# Patient Record
Sex: MALE | Race: BLACK OR AFRICAN AMERICAN | NOT HISPANIC OR LATINO | URBAN - METROPOLITAN AREA
[De-identification: names, ages, dates, MRNs, and addresses within clinical notes are randomized per-mention and may not be internally consistent; named-entity substitution may affect disease eponyms.]

---

## 2017-05-27 ENCOUNTER — INPATIENT (INPATIENT)
Facility: HOSPITAL | Age: 77
LOS: 4 days | Discharge: OTHER INSTITUTION | End: 2017-06-01
Attending: INTERNAL MEDICINE | Admitting: INTERNAL MEDICINE
Payer: MEDICAID

## 2017-05-27 VITALS
OXYGEN SATURATION: 98 % | HEART RATE: 65 BPM | TEMPERATURE: 99 F | RESPIRATION RATE: 20 BRPM | SYSTOLIC BLOOD PRESSURE: 89 MMHG | DIASTOLIC BLOOD PRESSURE: 60 MMHG

## 2017-05-27 DIAGNOSIS — R62.7 ADULT FAILURE TO THRIVE: ICD-10-CM

## 2017-05-27 LAB
ALBUMIN SERPL ELPH-MCNC: 2.6 G/DL — LOW (ref 3.3–5)
ALP SERPL-CCNC: 160 U/L — HIGH (ref 40–120)
ALT FLD-CCNC: 39 U/L — SIGNIFICANT CHANGE UP (ref 4–41)
APTT BLD: 29.6 SEC — SIGNIFICANT CHANGE UP (ref 27.5–37.4)
AST SERPL-CCNC: 47 U/L — HIGH (ref 4–40)
BASE EXCESS BLDV CALC-SCNC: -2.5 MMOL/L — SIGNIFICANT CHANGE UP
BASE EXCESS BLDV CALC-SCNC: 3.7 MMOL/L — SIGNIFICANT CHANGE UP
BASOPHILS # BLD AUTO: 0.01 K/UL — SIGNIFICANT CHANGE UP (ref 0–0.2)
BASOPHILS NFR BLD AUTO: 0.2 % — SIGNIFICANT CHANGE UP (ref 0–2)
BILIRUB SERPL-MCNC: 1 MG/DL — SIGNIFICANT CHANGE UP (ref 0.2–1.2)
BLOOD GAS VENOUS - CREATININE: 0.94 MG/DL — SIGNIFICANT CHANGE UP (ref 0.5–1.3)
BLOOD GAS VENOUS - CREATININE: 1.45 MG/DL — HIGH (ref 0.5–1.3)
BUN SERPL-MCNC: 36 MG/DL — HIGH (ref 7–23)
CALCIUM SERPL-MCNC: 8.4 MG/DL — SIGNIFICANT CHANGE UP (ref 8.4–10.5)
CHLORIDE BLDV-SCNC: 105 MMOL/L — SIGNIFICANT CHANGE UP (ref 96–108)
CHLORIDE BLDV-SCNC: 117 MMOL/L — HIGH (ref 96–108)
CHLORIDE SERPL-SCNC: 100 MMOL/L — SIGNIFICANT CHANGE UP (ref 98–107)
CO2 SERPL-SCNC: 24 MMOL/L — SIGNIFICANT CHANGE UP (ref 22–31)
CREAT SERPL-MCNC: 1.39 MG/DL — HIGH (ref 0.5–1.3)
EOSINOPHIL # BLD AUTO: 0.05 K/UL — SIGNIFICANT CHANGE UP (ref 0–0.5)
EOSINOPHIL NFR BLD AUTO: 0.9 % — SIGNIFICANT CHANGE UP (ref 0–6)
GAS PNL BLDV: 137 MMOL/L — SIGNIFICANT CHANGE UP (ref 136–146)
GAS PNL BLDV: 138 MMOL/L — SIGNIFICANT CHANGE UP (ref 136–146)
GLUCOSE BLDV-MCNC: 111 — HIGH (ref 70–99)
GLUCOSE BLDV-MCNC: 85 — SIGNIFICANT CHANGE UP (ref 70–99)
GLUCOSE SERPL-MCNC: 116 MG/DL — HIGH (ref 70–99)
HCO3 BLDV-SCNC: 21 MMOL/L — SIGNIFICANT CHANGE UP (ref 20–27)
HCO3 BLDV-SCNC: 26 MMOL/L — SIGNIFICANT CHANGE UP (ref 20–27)
HCT VFR BLD CALC: 39.6 % — SIGNIFICANT CHANGE UP (ref 39–50)
HCT VFR BLDV CALC: 29.3 % — LOW (ref 39–51)
HCT VFR BLDV CALC: 41.6 % — SIGNIFICANT CHANGE UP (ref 39–51)
HGB BLD-MCNC: 12.8 G/DL — LOW (ref 13–17)
HGB BLDV-MCNC: 13.6 G/DL — SIGNIFICANT CHANGE UP (ref 13–17)
HGB BLDV-MCNC: 9.4 G/DL — LOW (ref 13–17)
IMM GRANULOCYTES NFR BLD AUTO: 1.2 % — SIGNIFICANT CHANGE UP (ref 0–1.5)
INR BLD: 1.21 — HIGH (ref 0.88–1.17)
LACTATE BLDV-MCNC: 1.2 MMOL/L — SIGNIFICANT CHANGE UP (ref 0.5–2)
LACTATE BLDV-MCNC: 2.7 MMOL/L — HIGH (ref 0.5–2)
LYMPHOCYTES # BLD AUTO: 0.68 K/UL — LOW (ref 1–3.3)
LYMPHOCYTES # BLD AUTO: 11.7 % — LOW (ref 13–44)
MCHC RBC-ENTMCNC: 28.3 PG — SIGNIFICANT CHANGE UP (ref 27–34)
MCHC RBC-ENTMCNC: 32.3 % — SIGNIFICANT CHANGE UP (ref 32–36)
MCV RBC AUTO: 87.6 FL — SIGNIFICANT CHANGE UP (ref 80–100)
MONOCYTES # BLD AUTO: 0.28 K/UL — SIGNIFICANT CHANGE UP (ref 0–0.9)
MONOCYTES NFR BLD AUTO: 4.8 % — SIGNIFICANT CHANGE UP (ref 2–14)
NEUTROPHILS # BLD AUTO: 4.7 K/UL — SIGNIFICANT CHANGE UP (ref 1.8–7.4)
NEUTROPHILS NFR BLD AUTO: 81.2 % — HIGH (ref 43–77)
PCO2 BLDV: 38 MMHG — LOW (ref 41–51)
PCO2 BLDV: 43 MMHG — SIGNIFICANT CHANGE UP (ref 41–51)
PH BLDV: 7.38 PH — SIGNIFICANT CHANGE UP (ref 7.32–7.43)
PH BLDV: 7.43 PH — SIGNIFICANT CHANGE UP (ref 7.32–7.43)
PLATELET # BLD AUTO: 122 K/UL — LOW (ref 150–400)
PMV BLD: 10 FL — SIGNIFICANT CHANGE UP (ref 7–13)
PO2 BLDV: < 24 MMHG — LOW (ref 35–40)
PO2 BLDV: < 24 MMHG — LOW (ref 35–40)
POTASSIUM BLDV-SCNC: 3.3 MMOL/L — LOW (ref 3.4–4.5)
POTASSIUM BLDV-SCNC: 4.8 MMOL/L — HIGH (ref 3.4–4.5)
POTASSIUM SERPL-MCNC: 4.7 MMOL/L — SIGNIFICANT CHANGE UP (ref 3.5–5.3)
POTASSIUM SERPL-SCNC: 4.7 MMOL/L — SIGNIFICANT CHANGE UP (ref 3.5–5.3)
PROT SERPL-MCNC: 6.1 G/DL — SIGNIFICANT CHANGE UP (ref 6–8.3)
PROTHROM AB SERPL-ACNC: 13.6 SEC — HIGH (ref 9.8–13.1)
RBC # BLD: 4.52 M/UL — SIGNIFICANT CHANGE UP (ref 4.2–5.8)
RBC # FLD: 17.1 % — HIGH (ref 10.3–14.5)
SAO2 % BLDV: 18.9 % — LOW (ref 60–85)
SAO2 % BLDV: 21.4 % — LOW (ref 60–85)
SODIUM SERPL-SCNC: 140 MMOL/L — SIGNIFICANT CHANGE UP (ref 135–145)
WBC # BLD: 5.79 K/UL — SIGNIFICANT CHANGE UP (ref 3.8–10.5)
WBC # FLD AUTO: 5.79 K/UL — SIGNIFICANT CHANGE UP (ref 3.8–10.5)

## 2017-05-27 PROCEDURE — 70450 CT HEAD/BRAIN W/O DYE: CPT | Mod: 26

## 2017-05-27 PROCEDURE — 71020: CPT | Mod: 26

## 2017-05-27 PROCEDURE — 99223 1ST HOSP IP/OBS HIGH 75: CPT

## 2017-05-27 RX ORDER — SODIUM CHLORIDE 9 MG/ML
1000 INJECTION INTRAMUSCULAR; INTRAVENOUS; SUBCUTANEOUS ONCE
Qty: 0 | Refills: 0 | Status: COMPLETED | OUTPATIENT
Start: 2017-05-27 | End: 2017-05-27

## 2017-05-27 RX ORDER — ONDANSETRON 8 MG/1
4 TABLET, FILM COATED ORAL ONCE
Qty: 0 | Refills: 0 | Status: COMPLETED | OUTPATIENT
Start: 2017-05-27 | End: 2017-05-27

## 2017-05-27 RX ADMIN — ONDANSETRON 4 MILLIGRAM(S): 8 TABLET, FILM COATED ORAL at 20:26

## 2017-05-27 RX ADMIN — SODIUM CHLORIDE 1000 MILLILITER(S): 9 INJECTION INTRAMUSCULAR; INTRAVENOUS; SUBCUTANEOUS at 20:24

## 2017-05-27 RX ADMIN — SODIUM CHLORIDE 1000 MILLILITER(S): 9 INJECTION INTRAMUSCULAR; INTRAVENOUS; SUBCUTANEOUS at 21:13

## 2017-05-27 NOTE — ED PROVIDER NOTE - CARE PLAN
Principal Discharge DX:	Failure to thrive in adult  Secondary Diagnosis:	Fall  Secondary Diagnosis:	Abrasion

## 2017-05-27 NOTE — ED PROVIDER NOTE - OBJECTIVE STATEMENT
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 Pt with decreased PO intake, vomiting, weakness, fatigue. Was just d/c'd from [facility] after 1.5month stay in which he was dx and radiation treatment started at that time. Pt now lethargic, had fall 2 days ago 2/2 weakness, hit L side of head. Denies LOC, vomiting, CP, vision change, abd pain.

## 2017-05-27 NOTE — ED ADULT TRIAGE NOTE - CHIEF COMPLAINT QUOTE
c/o vomiting today pt history of lung cancer with mets currently receiving radiation treatments family states "he is not eating he fell two days ago has abrasion swelling over left eyebrow

## 2017-05-27 NOTE — ED PROVIDER NOTE - ATTENDING CONTRIBUTION TO CARE
I performed a face-to-face evaluation of the patient and performed a history and physical examination. I agree with the history and physical examination.    Jenn: 76 M, lung Ca mets to hip, spine, brain, vomiting and dehydration. Fell 2 days ago, hit L forehead just above eye. No HA now. Appears chronically ill, cachectic. Small abrasion above L eye. Normal mentation. Strong pulse. SBP 83, . Respirations unlabored. No LE edema. Will admit for failure-to-thrive. Give IVF. Check head CT.

## 2017-05-27 NOTE — ED PROVIDER NOTE - MEDICAL DECISION MAKING DETAILS
Jenn: 76 M, lung Ca mets to hip, spine, brain, vomiting and dehydration. Fell 2 days ago, hit L forehead just above eye. No HA now. Appears chronically ill, cachectic. Small abrasion above L eye. Normal mentation. Strong pulse. SBP 83, . Respirations unlabored. No LE edema. Will admit for failure-to-thrive. Give IVF. Check head CT.

## 2017-05-28 DIAGNOSIS — C34.90 MALIGNANT NEOPLASM OF UNSPECIFIED PART OF UNSPECIFIED BRONCHUS OR LUNG: ICD-10-CM

## 2017-05-28 DIAGNOSIS — Z29.9 ENCOUNTER FOR PROPHYLACTIC MEASURES, UNSPECIFIED: ICD-10-CM

## 2017-05-28 DIAGNOSIS — L89.310 PRESSURE ULCER OF RIGHT BUTTOCK, UNSTAGEABLE: ICD-10-CM

## 2017-05-28 DIAGNOSIS — I63.9 CEREBRAL INFARCTION, UNSPECIFIED: ICD-10-CM
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
## 2017-05-28 DIAGNOSIS — N17.9 ACUTE KIDNEY FAILURE, UNSPECIFIED: ICD-10-CM

## 2017-05-28 DIAGNOSIS — R62.7 ADULT FAILURE TO THRIVE: ICD-10-CM

## 2017-05-28 DIAGNOSIS — S02.69XA FRACTURE OF MANDIBLE OF OTHER SPECIFIED SITE, INITIAL ENCOUNTER FOR CLOSED FRACTURE: Chronic | ICD-10-CM

## 2017-05-28 DIAGNOSIS — D64.9 ANEMIA, UNSPECIFIED: ICD-10-CM

## 2017-05-28 LAB
ALBUMIN SERPL ELPH-MCNC: 2.2 G/DL — LOW (ref 3.3–5)
BASOPHILS NFR SPEC: 0 % — SIGNIFICANT CHANGE UP (ref 0–2)
BUN SERPL-MCNC: 29 MG/DL — HIGH (ref 7–23)
CALCIUM SERPL-MCNC: 7.8 MG/DL — LOW (ref 8.4–10.5)
CHLORIDE SERPL-SCNC: 108 MMOL/L — HIGH (ref 98–107)
CO2 SERPL-SCNC: 20 MMOL/L — LOW (ref 22–31)
CREAT SERPL-MCNC: 0.8 MG/DL — SIGNIFICANT CHANGE UP (ref 0.5–1.3)
EOSINOPHIL NFR FLD: 2.6 % — SIGNIFICANT CHANGE UP (ref 0–6)
FERRITIN SERPL-MCNC: 3552 NG/ML — HIGH (ref 30–400)
FOLATE SERPL-MCNC: 13.3 NG/ML — SIGNIFICANT CHANGE UP (ref 4.7–20)
GIANT PLATELETS BLD QL SMEAR: PRESENT — SIGNIFICANT CHANGE UP
GLUCOSE SERPL-MCNC: 74 MG/DL — SIGNIFICANT CHANGE UP (ref 70–99)
HCT VFR BLD CALC: 34.8 % — LOW (ref 39–50)
HGB BLD-MCNC: 11.1 G/DL — LOW (ref 13–17)
IRON SATN MFR SERPL: 129 UG/DL — LOW (ref 155–535)
IRON SATN MFR SERPL: 30 UG/DL — LOW (ref 45–165)
LYMPHOCYTES NFR SPEC AUTO: 2.6 % — LOW (ref 13–44)
MAGNESIUM SERPL-MCNC: 2.1 MG/DL — SIGNIFICANT CHANGE UP (ref 1.6–2.6)
MCHC RBC-ENTMCNC: 28.2 PG — SIGNIFICANT CHANGE UP (ref 27–34)
MCHC RBC-ENTMCNC: 31.9 % — LOW (ref 32–36)
MCV RBC AUTO: 88.3 FL — SIGNIFICANT CHANGE UP (ref 80–100)
MONOCYTES NFR BLD: 4.3 % — SIGNIFICANT CHANGE UP (ref 2–9)
NEUTROPHIL AB SER-ACNC: 80.1 % — HIGH (ref 43–77)
NEUTS BAND # BLD: 10.4 % — HIGH (ref 0–6)
PHOSPHATE SERPL-MCNC: 3.7 MG/DL — SIGNIFICANT CHANGE UP (ref 2.5–4.5)
PLATELET # BLD AUTO: 129 K/UL — LOW (ref 150–400)
PLATELET COUNT - ESTIMATE: SIGNIFICANT CHANGE UP
PMV BLD: 10.4 FL — SIGNIFICANT CHANGE UP (ref 7–13)
POIKILOCYTOSIS BLD QL AUTO: SIGNIFICANT CHANGE UP
POTASSIUM SERPL-MCNC: 4.3 MMOL/L — SIGNIFICANT CHANGE UP (ref 3.5–5.3)
POTASSIUM SERPL-SCNC: 4.3 MMOL/L — SIGNIFICANT CHANGE UP (ref 3.5–5.3)
RBC # BLD: 3.94 M/UL — LOW (ref 4.2–5.8)
RBC # FLD: 17.3 % — HIGH (ref 10.3–14.5)
SODIUM SERPL-SCNC: 144 MMOL/L — SIGNIFICANT CHANGE UP (ref 135–145)
SPECIMEN SOURCE: SIGNIFICANT CHANGE UP
UIBC SERPL-MCNC: 99 UG/DL — LOW (ref 110–370)
VIT B12 SERPL-MCNC: > 2000 PG/ML — HIGH (ref 200–900)
WBC # BLD: 3.84 K/UL — SIGNIFICANT CHANGE UP (ref 3.8–10.5)
WBC # FLD AUTO: 3.84 K/UL — SIGNIFICANT CHANGE UP (ref 3.8–10.5)

## 2017-05-28 PROCEDURE — 73630 X-RAY EXAM OF FOOT: CPT | Mod: 26,LT

## 2017-05-28 PROCEDURE — 72170 X-RAY EXAM OF PELVIS: CPT | Mod: 26

## 2017-05-28 PROCEDURE — 73552 X-RAY EXAM OF FEMUR 2/>: CPT | Mod: 26,RT

## 2017-05-28 PROCEDURE — 73610 X-RAY EXAM OF ANKLE: CPT | Mod: 26,LT

## 2017-05-28 RX ORDER — ONDANSETRON 8 MG/1
4 TABLET, FILM COATED ORAL EVERY 6 HOURS
Qty: 0 | Refills: 0 | Status: DISCONTINUED | OUTPATIENT
Start: 2017-05-28 | End: 2017-06-01

## 2017-05-28 RX ORDER — ATORVASTATIN CALCIUM 80 MG/1
40 TABLET, FILM COATED ORAL AT BEDTIME
Qty: 0 | Refills: 0 | Status: DISCONTINUED | OUTPATIENT
Start: 2017-05-28 | End: 2017-05-30

## 2017-05-28 RX ORDER — SODIUM CHLORIDE 9 MG/ML
1000 INJECTION INTRAMUSCULAR; INTRAVENOUS; SUBCUTANEOUS
Qty: 0 | Refills: 0 | Status: DISCONTINUED | OUTPATIENT
Start: 2017-05-28 | End: 2017-06-01

## 2017-05-28 RX ORDER — ASPIRIN/CALCIUM CARB/MAGNESIUM 324 MG
81 TABLET ORAL DAILY
Qty: 0 | Refills: 0 | Status: DISCONTINUED | OUTPATIENT
Start: 2017-05-28 | End: 2017-06-01

## 2017-05-28 RX ORDER — ASPIRIN/CALCIUM CARB/MAGNESIUM 324 MG
1 TABLET ORAL
Qty: 0 | Refills: 0 | COMMUNITY

## 2017-05-28 RX ADMIN — SODIUM CHLORIDE 75 MILLILITER(S): 9 INJECTION INTRAMUSCULAR; INTRAVENOUS; SUBCUTANEOUS at 01:07

## 2017-05-28 RX ADMIN — SODIUM CHLORIDE 50 MILLILITER(S): 9 INJECTION INTRAMUSCULAR; INTRAVENOUS; SUBCUTANEOUS at 15:58

## 2017-05-28 NOTE — PROGRESS NOTE ADULT - PROBLEM SELECTOR PLAN 5
- Will need to obtain records from [facility] to properly evaluate extent and work up of patient's disease  - onc eval after discussing with pts family  - Will check xrays of pelvis/R femur, L ankle/foot given pain when moving those joints to exclude bone pathology given his history of bone mets

## 2017-05-28 NOTE — DIETITIAN INITIAL EVALUATION ADULT. - OTHER INFO
Nutrition consult received for cachexia, FTT; admitted from home for weakness, frequent falls and progressive decline in appetite/PO. Medical diagnosis of stage 4 lung cancer with mets to the brain, bone, spine, s/p RT. Met with patient, speaks very softly and slow. RN confirms PO intake is minimal, only with a few bites of oatmeal and yogurt at breakfast. No GI distress (nausea/vomiting/diarrhea/constipation) reported. Patient appears with poor dentition however denies any issues chewing and declines need for softer diet consistency to aid in PO intake. Patient did not provide much diet history and although confirms h/o weight loss, cannot quantify or recall UBW. Ensure supplements suggested which patient was amenable to.

## 2017-05-28 NOTE — DIETITIAN INITIAL EVALUATION ADULT. - SIGNS/SYMPTOMS
as evidenced by <75% nutrition needs >1 month, significant weight loss amount not specified) as evidenced by stage 2 pressure ulcer, poor PO intake, stage 4 lung cancer

## 2017-05-28 NOTE — H&P ADULT - PROBLEM SELECTOR PLAN 2
- Fall likely in setting of his R hip and L foot weakness, unclear cause for the weakness though given his history of brain mets/spinal mets and CT finding of prior infarcts they are the likely causes; states the weakness have been stable for the past 2 months, denies any numbness with the weakness  - Will obtain PT eval, fall precautions

## 2017-05-28 NOTE — H&P ADULT - PROBLEM SELECTOR PLAN 6
- No pharmacological DVT ppx for now given his recent history of brain mets, will place on SCDs for DVT ppx  - Fall precautions - Was discharged on aspirin 81 from [facility] though he does not know why he was started on it  - Denies being told he has had CVAs in past  - Will c/w ASA 81 for now, will add on lipitor 40mg qhs  - Consider neuro eval in AM - Wound care eval in AM

## 2017-05-28 NOTE — PATIENT PROFILE ADULT. - NS TRANSFER PATIENT BELONGINGS
Cell Phone/PDA (specify)/Clothing/shirt and pants, underwear, undershirt, $13 in wallet/Jewelry/Money (specify)

## 2017-05-28 NOTE — H&P ADULT - PROBLEM SELECTOR PLAN 5
- Was discharged on aspirin 81 from [facility] though he does not know why he was started on it  - Denies being told he has had CVAs in past  - Will c/w ASA 81 for now, will add on lipitor 40mg qhs  - Consider neuro eval in AM - Wound care eval in AM - Will need to obtain records from [facility] to properly evaluate extent and work up of patient's disease  - Consider onc eval in AM  - Will check xrays of pelvis/R femur, L ankle/foot given pain when moving those joints to exclude bone pathology given his history of bone mets

## 2017-05-28 NOTE — H&P ADULT - PROBLEM SELECTOR PROBLEM 5
Cerebrovascular accident (CVA), unspecified mechanism Decubitus ulcer of right buttock, unstageable Lung cancer metastatic to bone

## 2017-05-28 NOTE — H&P ADULT - HISTORY OF PRESENT ILLNESS
This is a 76M with history of Stage 4 lung cancer with mets to brain, spine and bone s/p RT (recently hospitalized at [facility] from 4/13 - 5/25) who is brought to the hospital by his daughter due to weakness and inability to care for self at home. As per the patient and his daughter, he had initially gone to [facility] for a check up and lab work there showed gross abnormalities (both dont remember what the abnormalities were) which caused him to be hospitalized at [facility] where work up showed him to have stage 4 lung cancer with mets to brain, spine and bones. They said that he had RT during his hospitalization with last session being on 5/24 and was discharged home on 5/25. Daughter said that after the patient was discharged home, he was unable to care for himself properly, said that the patient is now completely dependant on others for his care when previously he was independent. Said that the patient's appetite is also severely decreased and that he experiences nausea/vomiting frequently after eating. Said that the patient also has had falls while trying to ambulate and recently suffered a small cut to his L eyebrow. Due to the patient's deconditioning, the daughter decided to bring him into the hospital for evaluation. Patient currently states that he is having some pain in his R thigh and L foot especially with movement, said that his L foot drags whenever he tries to walk and that causes him to fall, also said that he is having urinary incontinence 2/2 difficulty ambulating to the bathroom and therefore soils himself, denies any fecal incontinence. Said that these symptoms have been stable for the past 2 months, denies any worsening. Denies any HAs, numbness, back pain, neck pain, SOB, or other symptoms currently.     In the ED, his vitals were T 98.7, P 85, BP 89/60-> 108/57, R 20, O2 sat 98% RA. His lab work was significant for elevated BUN/Cr and elevated lactate. He was given 2L NS and zofran 4mg IVP x1. He was admitted to medicine.

## 2017-05-28 NOTE — H&P ADULT - NSHPPHYSICALEXAM_GEN_ALL_CORE
Vital Signs Last 24 Hrs  T(C): 36.7, Max: 37.1 (05-27 @ 18:41)  T(F): 98, Max: 98.7 (05-27 @ 18:41)  HR: 88 (65 - 112)  BP: 98/55 (83/58 - 108/57)  BP(mean): --  RR: 18 (15 - 20)  SpO2: 100% (98% - 100%)    GENERAL: Cachectic   HEAD:  Small cut on R eyebrow, no discharge, no erythema, no pain  ENT: EOMI, PERRLA, conjunctiva and sclera clear, Neck supple, No JVD, moist mucosa, no pharynageal erythema, no tonsillar enlargement or exudate  CHEST/LUNG: +expiratory crackles throughout  BACK: No spinal tenderness  HEART: Regular rate and rhythm; No murmurs, rubs, or gallops  ABDOMEN: Soft, Nontender, Nondistended; Bowel sounds present, no organomegaly  EXTREMITIES:  No clubbing, cyanosis, or edema  PSYCH: Nl behavior, nl affect  NEUROLOGY: AAOx3, CN II-XII intact; 5/5 strength on b/l upper extremities; 4/5 strength of R hip, 5/5 strength of R knee and R ankle/foot; 5/5 strength of L hip and L knee, 4/5 strength of L ankle/foot  SKIN: unstable ulcer of R buttock, no discharge or drainage

## 2017-05-28 NOTE — H&P ADULT - PROBLEM SELECTOR PROBLEM 6
Need for prophylactic measure Cerebrovascular accident (CVA), unspecified mechanism Decubitus ulcer of right buttock, unstageable

## 2017-05-28 NOTE — DIETITIAN INITIAL EVALUATION ADULT. - ETIOLOGY
related to patient meets criteria for severe malnutrition in context of chronic illness related to wound healing, metastatic disease

## 2017-05-28 NOTE — H&P ADULT - ASSESSMENT
This is a 76M with history of stage 4 cancer s/p RT who presents to the hospital with failure to thrive.

## 2017-05-28 NOTE — H&P ADULT - PROBLEM SELECTOR PROBLEM 4
Decubitus ulcer of right buttock, unstageable Lung cancer metastatic to bone Anemia, unspecified type

## 2017-05-28 NOTE — H&P ADULT - PROBLEM SELECTOR PLAN 4
- Wound care eval in AM - Will need to obtain records from [facility] to properly evaluate extent and work up of patient's disease  - Consider onc eval in AM  - Will check xrays of pelvis/R femur, L ankle/foot given pain when moving those joints to exclude bone pathology given his history of bone mets - patient with mild anemia initially that worsened with hydration suggesting that initial levels were likely hemoconcentrated  - Would trend level in AM, would check iron studies, B12, folate

## 2017-05-28 NOTE — H&P ADULT - PROBLEM SELECTOR PLAN 3
- Will need to obtain records from [facility] to properly evaluate extent and work up of patient's disease  - Consider onc eval in AM  - Will check xrays of pelvis/R femur, L ankle/foot given pain when moving those joints to exclude bone pathology given his history of bone mets - patient with elevated BUN/Cr that improved on repeat VBG s/p IVF, likely has EMMETT due to decreased PO intake  - Will continue with IVF x12 hrs and check BUN/Cr in AM

## 2017-05-28 NOTE — H&P ADULT - PROBLEM SELECTOR PLAN 7
- No pharmacological DVT ppx for now given his recent history of brain mets, will place on SCDs for DVT ppx  - Fall precautions - Was discharged on aspirin 81 from [facility] though he does not know why he was started on it  - Denies being told he has had CVAs in past  - Will c/w ASA 81 for now, will add on lipitor 40mg qhs  - Consider neuro eval in AM

## 2017-05-28 NOTE — PROGRESS NOTE ADULT - PROBLEM SELECTOR PLAN 7
-cont aspirin which   - Denies being told he has had CVAs in past  - Will c/w ASA 81 for now, will add on lipitor 40mg qhs

## 2017-05-28 NOTE — DIETITIAN INITIAL EVALUATION ADULT. - NS AS NUTRI INTERV MEALS SNACK3
1) Continue Regular diet which remains appropriate and adequate      2) Recommend Ensure Enlive 240mls 2x daily (700kcal, 40g protein).    3) If medical condition and goals of care warrants, recommend discussion for alternate means of nutrition support/General/healthful diet General/healthful diet/1) Continue Regular diet which remains appropriate and adequate. Will also provide Mighty Shake 1x/day at breakfast to further optimize PO intake      2) Recommend Ensure Enlive 240mls 2x daily (700kcal, 40g protein).    3) If medical condition and goals of care warrants, recommend discussion for alternate means of nutrition support

## 2017-05-28 NOTE — PROGRESS NOTE ADULT - SUBJECTIVE AND OBJECTIVE BOX
SUBJECTIVE / OVERNIGHT EVENTS: denies chest pain, shortness of breath      MEDICATIONS  (STANDING):  sodium chloride 0.9%. 1000milliLiter(s) IV Continuous <Continuous>  aspirin enteric coated 81milliGRAM(s) Oral daily  atorvastatin 40milliGRAM(s) Oral at bedtime    MEDICATIONS  (PRN):  ondansetron Injectable 4milliGRAM(s) IV Push every 6 hours PRN Nausea and/or Vomiting      Vital Signs Last 24 Hrs  T(C): 36.9, Max: 37.1 (05-27 @ 18:41)  HR: 98 (65 - 112)  BP: 103/56 (83/58 - 108/57)  RR: 18 (15 - 20)  SpO2: 100% (98% - 100%)  Wt(kg): --  CAPILLARY BLOOD GLUCOSE    I&O's Summary      Constitutional: No fever, fatigue or weight loss.  Skin: No rash.  Eyes: No recent vision problems or eye pain.  ENT: No congestion, ear pain, or sore throat.  Endocrine: No thyroid problems.  Cardiovascular: No chest pain or palpation.  Respiratory: No cough, shortness of breath, congestion, or wheezing.  Gastrointestinal: No abdominal pain, nausea, vomiting, or diarrhea.  Genitourinary: No dysuria.  Musculoskeletal: No joint swelling.  Neurologic: No headache.    PHYSICAL EXAM:  GENERAL: NAD, well-developed  HEAD:  Atraumatic, Normocephalic  EYES: EOMI, PERRLA, conjunctiva and sclera clear  NECK: Supple, No JVD  CHEST/LUNG: dec breath sounds at bases  HEART: Regular rate and rhythm; No murmurs, rubs, or gallops  ABDOMEN: Soft, Nontender, Nondistended; Bowel sounds present  EXTREMITIES:  2+ Peripheral Pulses, No clubbing, cyanosis, or edema  PSYCH: Alert awake calm cooperative  SKIN: No rashes or lesions  LABS:                        11.1   3.84  )-----------( 129      ( 28 May 2017 06:45 )             34.8     05-28    144  |  108<H>  |  29<H>  ----------------------------<  74  4.3   |  20<L>  |  0.80    Ca    7.8<L>      28 May 2017 06:45  Phos  3.7     05-28  Mg     2.1     05-28    TPro  x   /  Alb  2.2<L>  /  TBili  x   /  DBili  x   /  AST  x   /  ALT  x   /  AlkPhos  x   05-28    PT/INR - ( 27 May 2017 19:51 )   PT: 13.6 SEC;   INR: 1.21          PTT - ( 27 May 2017 19:51 )  PTT:29.6 SEC          RADIOLOGY & ADDITIONAL TESTS:    Imaging Personally Reviewed:    Consultant(s) Notes Reviewed:      Care Discussed with Consultants/Other Providers:

## 2017-05-28 NOTE — H&P ADULT - NSHPLABSRESULTS_GEN_ALL_CORE
Labs (27 May 2017 19:51):               12.8   5.79  )-----------( 122                   39.6     140  |  100  |  36<H>  ----------------------------<  116<H>  4.7   |  24  |  1.39<H>    Ca    8.4      27 May 2017 19:51  TPro  6.1  /  Alb  2.6<L>  /  TBili  1.0  /  DBili  x   /  AST  47<H>  /  ALT  39  /  AlkPhos  160<H>  05-27    LIVER FUNCTIONS - ( 27 May 2017 19:51 )  Alb: 2.6 g/dL / Pro: 6.1 g/dL / ALK PHOS: 160 u/L / ALT: 39 u/L / AST: 47 u/L / GGT: x           PT/INR - ( 27 May 2017 19:51 )   PT: 13.6 SEC;   INR: 1.21     PTT - ( 27 May 2017 19:51 )  PTT:29.6 SEC    Lactate 2.7 -> 1.2    CXR - prelim - 1.6 cm left upper lobe mass which may be compatible with patient's history of lung cancer. Increased bilateral reticular opacities    CT Brain - There is no acute intra-axial or extra-axial hemorrhage. There is no mass effect or shift of the midline. Cerebral volume loss with prominence of the ventricles and sulci. Encephalomalacia and gliosis is noted in the right frontal lobe, likely from prior infarct. Mild ex vacuo dilatation on the frontal horn of the right lateral ventricle. Moderate chronic ischemic changes are seen in the frontoparietal white matter. No acute intracranial hemorrhage, mass effect, or CT evidence of an acute territorial infarct.

## 2017-05-28 NOTE — H&P ADULT - PROBLEM SELECTOR PLAN 8
- No pharmacological DVT ppx for now given his recent history of brain mets, will place on SCDs for DVT ppx  - Fall precautions

## 2017-05-28 NOTE — H&P ADULT - NSHPREVIEWOFSYSTEMS_GEN_ALL_CORE
REVIEW OF SYSTEMS:    CONSTITUTIONAL: +Weakness/malaise, +anorexia, +weight loss ~20 lbs over 2 months; No fevers/chills  EYES/ENT: No visual changes;  No dysphagia  NECK: No pain or stiffness  RESPIRATORY: No cough, wheezing, hemoptysis; No shortness of breath  CARDIOVASCULAR: No chest pain or palpitations; No lower extremity edema  GASTROINTESTINAL: +Nausea/Vomiting; No abdominal or epigastric pain. No diarrhea or constipation. No melena or hematochezia.  GENITOURINARY: No dysuria, frequency or hematuria  MSK: R thigh pain and L ankle pain  NEUROLOGICAL: +weakness of R thigh and L foot; No numbness, no HA, no change in mental status  SKIN: unstageable ulcer on R buttock  All other review of systems is negative unless indicated above.

## 2017-05-28 NOTE — CHART NOTE - NSCHARTNOTEFT_GEN_A_CORE
NUTRITION SERVICES     Upon Nutritional Assessment by the Registered Dietitian your patient was determined to meet criteria/ has evidence of the following diagnosis/diagnoses:  [ ] Mild Protein Calorie Malnutrition   [ ] Moderate Protein Calorie Malnutrition   [ x ] Severe Protein Calorie Malnutrition   [ ] Unspecified Protein Calorie Malnutrition   [ ] Underweight / BMI <19  [ ] Morbid Obesity / BMI >40    Findings as based on:  •  Comprehensive nutrition assessment and consultation   •  Calorie Counts (nutrient intake analysis)   •  Food acceptance and intake status from observations by staff  •  Follow up  •  Patient education  •  Intervention secondary to interdisciplinary rounds  •   concerns     Treatment:  The following diet has been recommended:    1) Continue Regular diet which remains appropriate and adequate        2) Recommend Ensure Enlive 240mls 2x daily (700kcal, 40g protein).      3) If medical condition and goals of care warrants, recommend discussion for alternate means of nutrition support

## 2017-05-28 NOTE — H&P ADULT - NSHPSOCIALHISTORY_GEN_ALL_CORE
Social History:    Marital Status:  (   )    ( X ) Single    (   )    (  )   Occupation: Retired  Lives with: (  ) alone  (  ) children   (  ) spouse   (  ) parents  ( X ) other - Friends    Substance Use (street drugs): ( X ) never used  (  ) other:  Tobacco Usage:  Former Smoker, smoked 1/2 PPD x 60 yrs for 30 pack year history, Quit in Jan/Feb 2017  Alcohol Usage: Social drinker, last drink on New Years 2017

## 2017-05-29 LAB
APPEARANCE UR: CLEAR — SIGNIFICANT CHANGE UP
B PERT DNA SPEC QL NAA+PROBE: SIGNIFICANT CHANGE UP
BACTERIA # UR AUTO: SIGNIFICANT CHANGE UP
BILIRUB UR-MCNC: SIGNIFICANT CHANGE UP
BLOOD UR QL VISUAL: HIGH
C PNEUM DNA SPEC QL NAA+PROBE: NOT DETECTED — SIGNIFICANT CHANGE UP
COLOR SPEC: YELLOW — SIGNIFICANT CHANGE UP
FLUAV H1 2009 PAND RNA SPEC QL NAA+PROBE: NOT DETECTED — SIGNIFICANT CHANGE UP
FLUAV H1 RNA SPEC QL NAA+PROBE: NOT DETECTED — SIGNIFICANT CHANGE UP
FLUAV H3 RNA SPEC QL NAA+PROBE: NOT DETECTED — SIGNIFICANT CHANGE UP
FLUAV SUBTYP SPEC NAA+PROBE: SIGNIFICANT CHANGE UP
FLUBV RNA SPEC QL NAA+PROBE: NOT DETECTED — SIGNIFICANT CHANGE UP
GLUCOSE UR-MCNC: NEGATIVE — SIGNIFICANT CHANGE UP
HADV DNA SPEC QL NAA+PROBE: NOT DETECTED — SIGNIFICANT CHANGE UP
HCOV 229E RNA SPEC QL NAA+PROBE: NOT DETECTED — SIGNIFICANT CHANGE UP
HCOV HKU1 RNA SPEC QL NAA+PROBE: NOT DETECTED — SIGNIFICANT CHANGE UP
HCOV NL63 RNA SPEC QL NAA+PROBE: NOT DETECTED — SIGNIFICANT CHANGE UP
HCOV OC43 RNA SPEC QL NAA+PROBE: NOT DETECTED — SIGNIFICANT CHANGE UP
HMPV RNA SPEC QL NAA+PROBE: NOT DETECTED — SIGNIFICANT CHANGE UP
HPIV1 RNA SPEC QL NAA+PROBE: NOT DETECTED — SIGNIFICANT CHANGE UP
HPIV2 RNA SPEC QL NAA+PROBE: NOT DETECTED — SIGNIFICANT CHANGE UP
HPIV3 RNA SPEC QL NAA+PROBE: NOT DETECTED — SIGNIFICANT CHANGE UP
HPIV4 RNA SPEC QL NAA+PROBE: NOT DETECTED — SIGNIFICANT CHANGE UP
HYALINE CASTS # UR AUTO: SIGNIFICANT CHANGE UP (ref 0–?)
KETONES UR-MCNC: SIGNIFICANT CHANGE UP
LEUKOCYTE ESTERASE UR-ACNC: NEGATIVE — SIGNIFICANT CHANGE UP
M PNEUMO DNA SPEC QL NAA+PROBE: NOT DETECTED — SIGNIFICANT CHANGE UP
MUCOUS THREADS # UR AUTO: SIGNIFICANT CHANGE UP
NITRITE UR-MCNC: NEGATIVE — SIGNIFICANT CHANGE UP
PH UR: 6 — SIGNIFICANT CHANGE UP (ref 4.6–8)
PROT UR-MCNC: 100 — SIGNIFICANT CHANGE UP
RBC CASTS # UR COMP ASSIST: SIGNIFICANT CHANGE UP (ref 0–?)
RSV RNA SPEC QL NAA+PROBE: NOT DETECTED — SIGNIFICANT CHANGE UP
RV+EV RNA SPEC QL NAA+PROBE: NOT DETECTED — SIGNIFICANT CHANGE UP
SP GR SPEC: 1.03 — SIGNIFICANT CHANGE UP (ref 1–1.03)
SQUAMOUS # UR AUTO: SIGNIFICANT CHANGE UP
UROBILINOGEN FLD QL: >8 E.U. — SIGNIFICANT CHANGE UP (ref 0.1–0.2)
WBC UR QL: HIGH (ref 0–?)
YEAST BUDDING # UR COMP ASSIST: SIGNIFICANT CHANGE UP

## 2017-05-29 PROCEDURE — 71010: CPT | Mod: 26

## 2017-05-29 RX ORDER — ACETAMINOPHEN 500 MG
650 TABLET ORAL ONCE
Qty: 0 | Refills: 0 | Status: COMPLETED | OUTPATIENT
Start: 2017-05-29 | End: 2017-05-29

## 2017-05-29 RX ORDER — SODIUM CHLORIDE 9 MG/ML
1000 INJECTION INTRAMUSCULAR; INTRAVENOUS; SUBCUTANEOUS
Qty: 0 | Refills: 0 | Status: DISCONTINUED | OUTPATIENT
Start: 2017-05-29 | End: 2017-06-01

## 2017-05-29 RX ADMIN — SODIUM CHLORIDE 50 MILLILITER(S): 9 INJECTION INTRAMUSCULAR; INTRAVENOUS; SUBCUTANEOUS at 17:32

## 2017-05-29 RX ADMIN — Medication 81 MILLIGRAM(S): at 12:16

## 2017-05-29 RX ADMIN — Medication 650 MILLIGRAM(S): at 16:29

## 2017-05-29 RX ADMIN — ATORVASTATIN CALCIUM 40 MILLIGRAM(S): 80 TABLET, FILM COATED ORAL at 21:40

## 2017-05-29 NOTE — PROGRESS NOTE ADULT - SUBJECTIVE AND OBJECTIVE BOX
SUBJECTIVE / OVERNIGHT EVENTS: no chest pain, sob      MEDICATIONS  (STANDING):  sodium chloride 0.9%. 1000milliLiter(s) IV Continuous <Continuous>  aspirin enteric coated 81milliGRAM(s) Oral daily  atorvastatin 40milliGRAM(s) Oral at bedtime  sodium chloride 0.9%. 1000milliLiter(s) IV Continuous <Continuous>  sodium chloride 0.9%. 1000milliLiter(s) IV Continuous <Continuous>    MEDICATIONS  (PRN):  ondansetron Injectable 4milliGRAM(s) IV Push every 6 hours PRN Nausea and/or Vomiting      Vital Signs Last 24 Hrs  T(C): 36.6, Max: 39.1 (05-29 @ 15:23)  HR: 118 (95 - 118)  BP: 104/62 (100/56 - 105/65)  RR: 18 (18 - 20)  SpO2: 100% (93% - 100%)  Wt(kg): --  CAPILLARY BLOOD GLUCOSE    I&O's Summary    I & Os for current day (as of 29 May 2017 23:29)  =============================================  IN: 0 ml / OUT: 200 ml / NET: -200 ml      Constitutional: No fever, fatigue or weight loss.  Skin: No rash.  Eyes: No recent vision problems or eye pain.  ENT: No congestion, ear pain, or sore throat.  Endocrine: No thyroid problems.  Cardiovascular: No chest pain or palpation.  Respiratory: No cough, shortness of breath, congestion, or wheezing.  Gastrointestinal: No abdominal pain, nausea, vomiting, or diarrhea.  Genitourinary: No dysuria.  Musculoskeletal: No joint swelling.  Neurologic: No headache.    PHYSICAL EXAM:  GENERAL: NAD,cachectic  HEAD:  Atraumatic, Normocephalic  EYES: EOMI, PERRLA, conjunctiva and sclera clear  NECK: Supple, No JVD  CHEST/LUNG: dec breath sounds at bases  HEART: Regular rate and rhythm; No murmurs, rubs, or gallops  ABDOMEN: Soft, Nontender, Nondistended; Bowel sounds present  EXTREMITIES:  2+ Peripheral Pulses, No clubbing, cyanosis, or edema  PSYCH: Alert awake calm   SKIN: No rashes or lesions  LABS:                        11.1   3.84  )-----------( 129      ( 28 May 2017 06:45 )             34.8         144  |  108<H>  |  29<H>  ----------------------------<  74  4.3   |  20<L>  |  0.80    Ca    7.8<L>      28 May 2017 06:45  Phos  3.7       Mg     2.1         TPro  x   /  Alb  2.2<L>  /  TBili  x   /  DBili  x   /  AST  x   /  ALT  x   /  AlkPhos  x             Urinalysis Basic - ( 29 May 2017 21:15 )    Color: YELLOW / Appearance: CLEAR / S.026 / pH: 6.0  Gluc: NEGATIVE / Ketone: MODERATE  / Bili: SMALL / Urobili: >8 E.U.   Blood: MODERATE / Protein: 100 / Nitrite: NEGATIVE   Leuk Esterase: NEGATIVE / RBC: 10-25 / WBC 5-10   Sq Epi: OCC / Non Sq Epi: x / Bacteria: FEW        RADIOLOGY & ADDITIONAL TESTS:    Imaging Personally Reviewed:    Consultant(s) Notes Reviewed:      Care Discussed with Consultants/Other Providers:

## 2017-05-30 LAB
BUN SERPL-MCNC: 21 MG/DL — SIGNIFICANT CHANGE UP (ref 7–23)
CALCIUM SERPL-MCNC: 8.3 MG/DL — LOW (ref 8.4–10.5)
CHLORIDE SERPL-SCNC: 110 MMOL/L — HIGH (ref 98–107)
CO2 SERPL-SCNC: 15 MMOL/L — LOW (ref 22–31)
CREAT SERPL-MCNC: 0.65 MG/DL — SIGNIFICANT CHANGE UP (ref 0.5–1.3)
GLUCOSE SERPL-MCNC: 55 MG/DL — LOW (ref 70–99)
HCT VFR BLD CALC: 43.8 % — SIGNIFICANT CHANGE UP (ref 39–50)
HGB BLD-MCNC: 13.9 G/DL — SIGNIFICANT CHANGE UP (ref 13–17)
MCHC RBC-ENTMCNC: 27.8 PG — SIGNIFICANT CHANGE UP (ref 27–34)
MCHC RBC-ENTMCNC: 31.7 % — LOW (ref 32–36)
MCV RBC AUTO: 87.6 FL — SIGNIFICANT CHANGE UP (ref 80–100)
PLATELET # BLD AUTO: 132 K/UL — LOW (ref 150–400)
PMV BLD: 10.8 FL — SIGNIFICANT CHANGE UP (ref 7–13)
POTASSIUM SERPL-MCNC: 5.4 MMOL/L — HIGH (ref 3.5–5.3)
POTASSIUM SERPL-SCNC: 5.4 MMOL/L — HIGH (ref 3.5–5.3)
RBC # BLD: 5 M/UL — SIGNIFICANT CHANGE UP (ref 4.2–5.8)
RBC # FLD: 18.5 % — HIGH (ref 10.3–14.5)
SODIUM SERPL-SCNC: 146 MMOL/L — HIGH (ref 135–145)
SPECIMEN SOURCE: SIGNIFICANT CHANGE UP
WBC # BLD: 4.17 K/UL — SIGNIFICANT CHANGE UP (ref 3.8–10.5)
WBC # FLD AUTO: 4.17 K/UL — SIGNIFICANT CHANGE UP (ref 3.8–10.5)

## 2017-05-30 PROCEDURE — 71250 CT THORAX DX C-: CPT | Mod: 26

## 2017-05-30 RX ORDER — SODIUM CHLORIDE 9 MG/ML
1000 INJECTION, SOLUTION INTRAVENOUS
Qty: 0 | Refills: 0 | Status: DISCONTINUED | OUTPATIENT
Start: 2017-05-30 | End: 2017-05-31

## 2017-05-30 RX ORDER — DEXTROSE 50 % IN WATER 50 %
25 SYRINGE (ML) INTRAVENOUS DAILY
Qty: 0 | Refills: 0 | Status: DISCONTINUED | OUTPATIENT
Start: 2017-05-30 | End: 2017-05-30

## 2017-05-30 RX ORDER — SODIUM CHLORIDE 9 MG/ML
1000 INJECTION, SOLUTION INTRAVENOUS
Qty: 0 | Refills: 0 | Status: DISCONTINUED | OUTPATIENT
Start: 2017-05-30 | End: 2017-05-30

## 2017-05-30 RX ORDER — SODIUM CHLORIDE 9 MG/ML
1000 INJECTION INTRAMUSCULAR; INTRAVENOUS; SUBCUTANEOUS ONCE
Qty: 0 | Refills: 0 | Status: DISCONTINUED | OUTPATIENT
Start: 2017-05-30 | End: 2017-05-30

## 2017-05-30 RX ORDER — DEXTROSE 50 % IN WATER 50 %
25 SYRINGE (ML) INTRAVENOUS ONCE
Qty: 0 | Refills: 0 | Status: COMPLETED | OUTPATIENT
Start: 2017-05-30 | End: 2017-05-30

## 2017-05-30 RX ADMIN — Medication 25 MILLILITER(S): at 10:40

## 2017-05-30 RX ADMIN — Medication 81 MILLIGRAM(S): at 12:25

## 2017-05-30 NOTE — PROGRESS NOTE ADULT - SUBJECTIVE AND OBJECTIVE BOX
SUBJECTIVE / OVERNIGHT EVENTS: denies chest pain, sob      MEDICATIONS  (STANDING):  sodium chloride 0.9%. 1000milliLiter(s) IV Continuous <Continuous>  aspirin enteric coated 81milliGRAM(s) Oral daily  sodium chloride 0.9%. 1000milliLiter(s) IV Continuous <Continuous>  sodium chloride 0.9%. 1000milliLiter(s) IV Continuous <Continuous>  dextrose 5% + sodium chloride 0.9%. 1000milliLiter(s) IV Continuous <Continuous>    MEDICATIONS  (PRN):  ondansetron Injectable 4milliGRAM(s) IV Push every 6 hours PRN Nausea and/or Vomiting      Vital Signs Last 24 Hrs  T(C): 37.1, Max: 37.1 (05- @ 14:32)  HR: 80 (80 - 118)  BP: 116/83 (102/60 - 116/83)  RR: 20 (18 - 21)  SpO2: 98% (98% - 100%)  Wt(kg): --  CAPILLARY BLOOD GLUCOSE  117 (30 May 2017 16:32)  145 (30 May 2017 13:46)  182 (30 May 2017 10:59)  41 (30 May 2017 10:00)  61 (30 May 2017 09:29)    I&O's Summary   Skin: No rash.  Eyes: No recent vision problems or eye pain.  ENT: No congestion, ear pain, or sore throat.  Endocrine: No thyroid problems.  Cardiovascular: No chest pain or palpation.  Respiratory: No cough, shortness of breath, congestion, or wheezing.  Gastrointestinal: No abdominal pain, nausea, vomiting, or diarrhea.  Genitourinary: No dysuria.  Musculoskeletal: No joint swelling.  Neurologic: No headache.    PHYSICAL EXAM:  GENERAL: appears cachectic  HEAD:  Atraumatic, Normocephalic  EYES: EOMI, PERRLA, conjunctiva and sclera clear  NECK: Supple, No JVD  CHEST/LUNG: dec breath sounds at bases  HEART: Regular rate and rhythm; No murmurs, rubs, or gallops  ABDOMEN: Soft, Nontender, Nondistended; Bowel sounds present  EXTREMITIES:  2+ Peripheral Pulses, No clubbing, cyanosis, or edema  pt calm , cooperative    LABS:                        13.9   4.17  )-----------( 132      ( 30 May 2017 07:07 )             43.8     05-30    146<H>  |  110<H>  |  21  ----------------------------<  55<L>  5.4<H>   |  15<L>  |  0.65    Ca    8.3<L>      30 May 2017 07:07            Urinalysis Basic - ( 29 May 2017 21:15 )    Color: YELLOW / Appearance: CLEAR / S.026 / pH: 6.0  Gluc: NEGATIVE / Ketone: MODERATE  / Bili: SMALL / Urobili: >8 E.U.   Blood: MODERATE / Protein: 100 / Nitrite: NEGATIVE   Leuk Esterase: NEGATIVE / RBC: 10-25 / WBC 5-10   Sq Epi: OCC / Non Sq Epi: x / Bacteria: FEW        RADIOLOGY & ADDITIONAL TESTS:    Imaging Personally Reviewed:    Consultant(s) Notes Reviewed:      Care Discussed with Consultants/Other Providers:

## 2017-05-31 DIAGNOSIS — J96.01 ACUTE RESPIRATORY FAILURE WITH HYPOXIA: ICD-10-CM

## 2017-05-31 DIAGNOSIS — R53.2 FUNCTIONAL QUADRIPLEGIA: ICD-10-CM

## 2017-05-31 DIAGNOSIS — Z71.89 OTHER SPECIFIED COUNSELING: ICD-10-CM

## 2017-05-31 DIAGNOSIS — Z51.5 ENCOUNTER FOR PALLIATIVE CARE: ICD-10-CM

## 2017-05-31 LAB
BACTERIA UR CULT: SIGNIFICANT CHANGE UP
BASE EXCESS BLDA CALC-SCNC: -5.9 MMOL/L — SIGNIFICANT CHANGE UP
BUN SERPL-MCNC: 28 MG/DL — HIGH (ref 7–23)
CALCIUM SERPL-MCNC: 8 MG/DL — LOW (ref 8.4–10.5)
CHLORIDE SERPL-SCNC: 116 MMOL/L — HIGH (ref 98–107)
CO2 SERPL-SCNC: 20 MMOL/L — LOW (ref 22–31)
CREAT SERPL-MCNC: 0.69 MG/DL — SIGNIFICANT CHANGE UP (ref 0.5–1.3)
GLUCOSE BLDA-MCNC: 138 MG/DL — HIGH (ref 70–99)
GLUCOSE SERPL-MCNC: 136 MG/DL — HIGH (ref 70–99)
HCO3 BLDA-SCNC: 20 MMOL/L — LOW (ref 22–26)
HCT VFR BLDA CALC: 38.4 % — LOW (ref 39–51)
HGB BLDA-MCNC: 12.5 G/DL — LOW (ref 13–17)
PCO2 BLDA: 29 MMHG — LOW (ref 35–48)
PH BLDA: 7.41 PH — SIGNIFICANT CHANGE UP (ref 7.35–7.45)
PO2 BLDA: 66 MMHG — LOW (ref 83–108)
POTASSIUM BLDA-SCNC: 3.9 MMOL/L — SIGNIFICANT CHANGE UP (ref 3.4–4.5)
POTASSIUM SERPL-MCNC: 4.3 MMOL/L — SIGNIFICANT CHANGE UP (ref 3.5–5.3)
POTASSIUM SERPL-SCNC: 4.3 MMOL/L — SIGNIFICANT CHANGE UP (ref 3.5–5.3)
SAO2 % BLDA: 92.8 % — LOW (ref 95–99)
SODIUM BLDA-SCNC: 146 MMOL/L — SIGNIFICANT CHANGE UP (ref 136–146)
SODIUM SERPL-SCNC: 152 MMOL/L — HIGH (ref 135–145)
SPECIMEN SOURCE: SIGNIFICANT CHANGE UP

## 2017-05-31 PROCEDURE — 99497 ADVNCD CARE PLAN 30 MIN: CPT | Mod: 25

## 2017-05-31 PROCEDURE — 71010: CPT | Mod: 26

## 2017-05-31 PROCEDURE — 99223 1ST HOSP IP/OBS HIGH 75: CPT

## 2017-05-31 PROCEDURE — 93010 ELECTROCARDIOGRAM REPORT: CPT

## 2017-05-31 RX ORDER — IPRATROPIUM/ALBUTEROL SULFATE 18-103MCG
3 AEROSOL WITH ADAPTER (GRAM) INHALATION ONCE
Qty: 0 | Refills: 0 | Status: COMPLETED | OUTPATIENT
Start: 2017-05-31 | End: 2017-05-31

## 2017-05-31 RX ORDER — MORPHINE SULFATE 50 MG/1
1 CAPSULE, EXTENDED RELEASE ORAL ONCE
Qty: 0 | Refills: 0 | Status: DISCONTINUED | OUTPATIENT
Start: 2017-05-31 | End: 2017-05-31

## 2017-05-31 RX ORDER — MORPHINE SULFATE 50 MG/1
2 CAPSULE, EXTENDED RELEASE ORAL EVERY 4 HOURS
Qty: 0 | Refills: 0 | Status: DISCONTINUED | OUTPATIENT
Start: 2017-05-31 | End: 2017-06-01

## 2017-05-31 RX ORDER — SODIUM CHLORIDE 9 MG/ML
1000 INJECTION, SOLUTION INTRAVENOUS
Qty: 0 | Refills: 0 | Status: DISCONTINUED | OUTPATIENT
Start: 2017-05-31 | End: 2017-06-01

## 2017-05-31 RX ADMIN — MORPHINE SULFATE 1 MILLIGRAM(S): 50 CAPSULE, EXTENDED RELEASE ORAL at 08:22

## 2017-05-31 RX ADMIN — Medication 3 MILLILITER(S): at 08:49

## 2017-05-31 RX ADMIN — SODIUM CHLORIDE 50 MILLILITER(S): 9 INJECTION, SOLUTION INTRAVENOUS at 07:42

## 2017-05-31 RX ADMIN — MORPHINE SULFATE 2 MILLIGRAM(S): 50 CAPSULE, EXTENDED RELEASE ORAL at 20:44

## 2017-05-31 NOTE — PROGRESS NOTE ADULT - PROBLEM SELECTOR PROBLEM 3
Anemia, unspecified type
Anemia, unspecified type
EMMETT (acute kidney injury)
Anemia, unspecified type

## 2017-05-31 NOTE — PROGRESS NOTE ADULT - PROBLEM SELECTOR PLAN 1
encourage po intake, nutrition eval
encourage po intake, nutrition eval, iv fluids
- Patient with FTT 2/2 multifactorial causes - stage 4 lung cancer, prolonged recent hospitalization, anorexia  - Will obtain nutrition eval for nutrition optimization, will place on calorie count  - Regular diet for now, patient denies issues with eating currently
encourage po intake, nutrition eval

## 2017-05-31 NOTE — PROGRESS NOTE ADULT - PROBLEM SELECTOR PLAN 3
no signs of active bleeding
no signs of active bleeding
- improved with iv fluids
no signs of active bleeding

## 2017-05-31 NOTE — PROGRESS NOTE ADULT - PROBLEM SELECTOR PROBLEM 1
Failure to thrive in adult

## 2017-05-31 NOTE — CONSULT NOTE ADULT - PROBLEM SELECTOR RECOMMENDATION 5
30 minutes spent discussing goals of care and advanced directives with daughter and grandson. Patient unable to participate in conversation.  Both understand extremely poor prognosis.   Want comfort care.  Patient is DNR/DNI.   Await hospice evaluation for inpatient.

## 2017-05-31 NOTE — CONSULT NOTE ADULT - ASSESSMENT
76 F with stage IV lung cancer, respiratory failure, functional quadriplegia, encounter for palliative care.

## 2017-05-31 NOTE — CONSULT NOTE ADULT - PROBLEM SELECTOR RECOMMENDATION 9
Widely metastatic lung cancer.   No further disease modifying interventions possible.  Patient qualifies for hospice.

## 2017-05-31 NOTE — PROGRESS NOTE ADULT - SUBJECTIVE AND OBJECTIVE BOX
Subjective:  Called to room by Rn. Patient c/o SOB. Found to be tachypneic with HR in 40's.  patient denies any further complaints.     REVIEW OF SYSTEMS:    RESPIRATORY:  shortness of breath  CARDIOVASCULAR: No chest pain, palpitations, dizziness, or leg swelling  GASTROINTESTINAL: No abdominal or epigastric pain. No nausea, vomiting, or hematemesis; No diarrhea or constipation. No melena or hematochezia.  GENITOURINARY: No dysuria, frequency, hematuria, or incontinence  NEUROLOGICAL: No headaches, memory loss, loss of strength, numbness, or tremors    PHYSICAL EXAM:    GENERAL: SOB, Tachypneic, cachetic appearance  NERVOUS SYSTEM:  Alert & Oriented X3,   CHEST/LUNG: Decreased breath sounds  HEART: Regular rate and rhythm; No murmurs, rubs, or gallops  ABDOMEN: Soft, Nontender, Nondistended; Bowel sounds present    RADIOLOGY :      Assessment: Patient 76y with Failure to thrive in adult  Lung cancer with metastatic disease to brain and bone. Received RT tx at [facility] and was discharged on 5/25.   Fracture of mandible of other site  Shortness of breath, tachypneic. Likely 2/2 to progressive lung disease.       Plan:  ABG with lytes  100% NR  Chest xray  EKG  MICU c/s called, Dr. Chavez.  D/w daughter and patient.  Remains full code status.   Will f/u closely.

## 2017-05-31 NOTE — PROGRESS NOTE ADULT - PROBLEM SELECTOR PROBLEM 2
EMMETT (acute kidney injury)
EMMETT (acute kidney injury)
Fall, initial encounter
EMMETT (acute kidney injury)

## 2017-05-31 NOTE — PROGRESS NOTE ADULT - SUBJECTIVE AND OBJECTIVE BOX
chief complaint - dec po intake     SUBJECTIVE / OVERNIGHT EVENTS: earlier during the day , pt found in resp distress improved iwth morphine, oxygen , pts daughter was informed about pts clinical status/ guarded prognosis . pts daughter made pt DNR/ DNI,       MEDICATIONS  (STANDING):  sodium chloride 0.9%. 1000milliLiter(s) IV Continuous <Continuous>  aspirin enteric coated 81milliGRAM(s) Oral daily  sodium chloride 0.9%. 1000milliLiter(s) IV Continuous <Continuous>  sodium chloride 0.9%. 1000milliLiter(s) IV Continuous <Continuous>  dextrose 5%. 1000milliLiter(s) IV Continuous <Continuous>    MEDICATIONS  (PRN):  ondansetron Injectable 4milliGRAM(s) IV Push every 6 hours PRN Nausea and/or Vomiting  morphine  - Injectable 2milliGRAM(s) IV Push every 4 hours PRN shortness of breath      Vital Signs Last 24 Hrs  T(C): 37.8, Max: 37.8 (05-31 @ 20:26)  HR: 99 (66 - 106)  BP: 110/65 (100/69 - 114/70)  RR: 22 (20 - 30)  SpO2: 100% (96% - 100%)  Wt(kg): --  CAPILLARY BLOOD GLUCOSE  132 (31 May 2017 20:53)  122 (31 May 2017 17:06)  86 (31 May 2017 15:17)  96 (31 May 2017 08:38)  110 (31 May 2017 04:33)  110 (31 May 2017 00:32)    I&O's Summary      unable to obtain complete ROS from pt , pt is sleeping comfortable responding to questions intermittently     PHYSICAL EXAM:  GENERAL: cachectic  HEAD:  Atraumatic, Normocephalic  EYES: EOMI, PERRLA, conjunctiva and sclera clear  NECK: Supple, No JVD  CHEST/LUNG: bronchial breath sounds at bases  HEART: Regular rate and rhythm; No murmurs, rubs, or gallops  ABDOMEN: Soft, Nontender, Nondistended; Bowel sounds present  EXTREMITIES:  2+ Peripheral Pulses  neurology - pt lethargic  SKIN: No rashes or lesions  LABS:                        13.9   4.17  )-----------( 132      ( 30 May 2017 07:07 )             43.8     05-31    152<H>  |  116<H>  |  28<H>  ----------------------------<  136<H>  4.3   |  20<L>  |  0.69    Ca    8.0<L>      31 May 2017 05:42                RADIOLOGY & ADDITIONAL TESTS:    Imaging Personally Reviewed:    Consultant(s) Notes Reviewed:      Care Discussed with Consultants/Other Providers:

## 2017-05-31 NOTE — PROGRESS NOTE ADULT - PROBLEM SELECTOR PROBLEM 4
Lung cancer metastatic to bone
Lung cancer metastatic to bone
Anemia, unspecified type
Lung cancer metastatic to bone

## 2017-05-31 NOTE — PROGRESS NOTE ADULT - PROBLEM SELECTOR PLAN 2
improved
improved
- Fall likely in setting of his R hip and L foot weakness, unclear cause for the weakness though given his history of brain mets/spinal mets and CT finding of prior infarcts they are the likely causes; states the weakness have been stable for the past 2 months, denies any numbness with the weakness  - Will obtain PT eval, fall precautions
improved

## 2017-05-31 NOTE — CONSULT NOTE ADULT - PROBLEM SELECTOR RECOMMENDATION 2
Morphine 2mg IV Q4 hours prn. Low threshold for gtt.  Likely secondary to disease progression, lymphangitic spread, malignancy.

## 2017-05-31 NOTE — PROGRESS NOTE ADULT - PROBLEM SELECTOR PLAN 4
pts daughter requesting onc eval at [facility] , doesn't want to go back to [facility], will try to obtain med records from [facility]  palliative evaluated pt, poss hospice
pts daughter requesting onc eval at [facility] , doesn't want to go back to [facility], will try to obtain med records from [facility]
- no signs of active bleeding at present
pt to f/u with onc as out pt

## 2017-06-01 VITALS — DIASTOLIC BLOOD PRESSURE: 65 MMHG | HEART RATE: 70 BPM | RESPIRATION RATE: 28 BRPM | SYSTOLIC BLOOD PRESSURE: 122 MMHG

## 2017-06-01 LAB — BACTERIA BLD CULT: SIGNIFICANT CHANGE UP

## 2017-06-01 RX ORDER — DEXTROSE 50 % IN WATER 50 %
25 SYRINGE (ML) INTRAVENOUS ONCE
Qty: 0 | Refills: 0 | Status: COMPLETED | OUTPATIENT
Start: 2017-06-01 | End: 2017-06-01

## 2017-06-01 RX ORDER — SODIUM CHLORIDE 9 MG/ML
1000 INJECTION, SOLUTION INTRAVENOUS
Qty: 0 | Refills: 0 | Status: DISCONTINUED | OUTPATIENT
Start: 2017-06-01 | End: 2017-06-01

## 2017-06-01 RX ORDER — MORPHINE SULFATE 50 MG/1
2 CAPSULE, EXTENDED RELEASE ORAL
Qty: 0 | Refills: 0 | COMMUNITY
Start: 2017-06-01

## 2017-06-01 RX ORDER — SODIUM CHLORIDE 9 MG/ML
1000 INJECTION, SOLUTION INTRAVENOUS
Qty: 0 | Refills: 0 | COMMUNITY
Start: 2017-06-01

## 2017-06-01 RX ORDER — ONDANSETRON 8 MG/1
4 TABLET, FILM COATED ORAL
Qty: 0 | Refills: 0 | COMMUNITY
Start: 2017-06-01

## 2017-06-01 RX ORDER — MORPHINE SULFATE 50 MG/1
2 CAPSULE, EXTENDED RELEASE ORAL EVERY 4 HOURS
Qty: 0 | Refills: 0 | Status: DISCONTINUED | OUTPATIENT
Start: 2017-06-01 | End: 2017-06-01

## 2017-06-01 RX ADMIN — MORPHINE SULFATE 2 MILLIGRAM(S): 50 CAPSULE, EXTENDED RELEASE ORAL at 10:31

## 2017-06-01 RX ADMIN — SODIUM CHLORIDE 30 MILLILITER(S): 9 INJECTION, SOLUTION INTRAVENOUS at 12:30

## 2017-06-01 RX ADMIN — MORPHINE SULFATE 2 MILLIGRAM(S): 50 CAPSULE, EXTENDED RELEASE ORAL at 10:32

## 2017-06-01 RX ADMIN — SODIUM CHLORIDE 50 MILLILITER(S): 9 INJECTION, SOLUTION INTRAVENOUS at 01:39

## 2017-06-01 RX ADMIN — Medication 25 MILLILITER(S): at 01:39

## 2017-06-01 RX ADMIN — MORPHINE SULFATE 2 MILLIGRAM(S): 50 CAPSULE, EXTENDED RELEASE ORAL at 01:08

## 2017-06-01 NOTE — PROVIDER CONTACT NOTE (OTHER) - ACTION/TREATMENT ORDERED:
Give apple juice, restarting on fluids, recheck fingerstick in 15 minutes. Give apple juice, restarting on fluids, recheck fingerstick in 30 minutes.

## 2017-06-01 NOTE — DISCHARGE NOTE ADULT - CARE PROVIDER_API CALL
[facility],   [address] [facility]  Phone: [phone]  Fax: (       -

## 2017-06-01 NOTE — DISCHARGE NOTE ADULT - PATIENT PORTAL LINK FT
“You can access the Follow[employer] Patient Portal, offered by [facility], by registering with the following website: [email or web address]”

## 2017-06-01 NOTE — PROVIDER CONTACT NOTE (OTHER) - NAME OF MD/NP/PA/DO NOTIFIED:
CHAPIN Bernal
Juan Vanegas ADS
Leticia Santiago NP
Nakia Bernal
Prachi Morelos Np ADS
Prachi Morelos Np ADS
Juan Vanegas ADS

## 2017-06-01 NOTE — PROVIDER CONTACT NOTE (OTHER) - REASON
Fingerstick low
Low Blood Sugar
Patient resp rate and HR elevated
Pt with rectal temp 102.4
Temp 100.0
Temp 99.1
bp 98/60
1 set of blood cultures could only be sent.

## 2017-06-01 NOTE — PROVIDER CONTACT NOTE (OTHER) - RECOMMENDATIONS
Administered 2mg Morphine as per orders. Will reassess
Continue to monitor.
Continue to monitor.
Continue to monitor. Pt. refuses ice packs.
Continue to monitor. Pt. refuses ice packs.
Give apple juice
Recommendations and orders to follow
notify NP, cont to monitor
Recheck fingerstick after fluids run for a while

## 2017-06-01 NOTE — DISCHARGE NOTE ADULT - HOSPITAL COURSE
This is a 76M with history of Stage 4 lung cancer with mets to brain, spine and bone s/p RT (recently hospitalized at [facility] from 4/13 - 5/25) who is brought to the hospital by his daughter due to weakness and inability to care for self at home. Also with frequent falls, dec reased PO intake and nausea and vomiting after eating    Hospital Course:    Patient with noted metastatic disease to bone, brain.  treated at [facility] with RT and discharge on 5/25.  Readmitted for FFT, given IV hydration.  Patient seen by palliative care team, hospice evaluation made.  Patient and family in agreement for comfort care measures.    Recommendation to continue IV D5W  at 30ml/hr.  Patient receiving Morphine 2 mg IV Q 4 hours as needed.     [facility].

## 2017-06-01 NOTE — SWALLOW BEDSIDE ASSESSMENT ADULT - COMMENTS
Attempted initial assessment of swallow function this afternoon, however, patient currently on non-rebreather mask and is getting ready to be transferred to a hospice facility.

## 2017-06-01 NOTE — DISCHARGE NOTE ADULT - PLAN OF CARE
Comfort care Patient and family wishes for Comfort Care Measures.  [facility]. Continue pain medication as prescribed.

## 2017-06-01 NOTE — DISCHARGE NOTE ADULT - PROVIDER TOKENS
FREE:[LAST:[[facility]],PHONE:[phone]],FAX:[(   )    -],[facility]

## 2017-06-01 NOTE — DISCHARGE NOTE ADULT - MEDICATION SUMMARY - MEDICATIONS TO TAKE
I will START or STAY ON the medications listed below when I get home from the hospital:    aspirin 81 mg oral tablet  -- 1 tab(s) by mouth once a day  -- Indication: For Cerebrovascular accident (CVA), unspecified mechanism    morphine  -- 2 milligram(s) intravenous every 4 hours, As Needed  -- Indication: For pain management    ondansetron 2 mg/mL injectable solution  -- 4 milligram(s) injectable every 6 hours, As Needed  -- Indication: For Nausea    Dextrose 5% in Water intravenous solution  -- 1000 milliliter(s) intravenous   -- Indication: For hydration

## 2017-06-01 NOTE — PROVIDER CONTACT NOTE (OTHER) - DATE AND TIME:
29-May-2017 06:59
30-May-2017 06:36
01-Jun-2017 00:57
01-Jun-2017 01:15
28-May-2017 14:35
28-May-2017 22:38
29-May-2017 00:57
29-May-2017 14:30
31-May-2017 20:50

## 2017-06-01 NOTE — CONSULT NOTE ADULT - SUBJECTIVE AND OBJECTIVE BOX
Pulmonary Consult Note    EUGENIA PERDOMO  MRN-[id number]    Chief Complaint: Patient is a 76y old  Male who presents with a chief complaint of metastatic lung ca, failure to thrive.      HPI:  76yMale   -Currently on nonrebreather, no apparent distress, patient unable to elaborate on current symptoms.    ROS:  unable to obtain as patient falls asleep during questioning.    PAST MEDICAL HISTORY: HEALTH ISSUES - PROBLEM Dx:  Advanced care planning/counseling discussion: Advanced care planning/counseling discussion  Encounter for palliative care: Encounter for palliative care  Functional quadriplegia: Functional quadriplegia  Acute respiratory failure with hypoxia: Acute respiratory failure with hypoxia  Anemia, unspecified type: Anemia, unspecified type  EMMETT (acute kidney injury): EMMETT (acute kidney injury)  Need for prophylactic measure: Need for prophylactic measure  Cerebrovascular accident (CVA), unspecified mechanism: Cerebrovascular accident (CVA), unspecified mechanism  Decubitus ulcer of right buttock, unstageable: Decubitus ulcer of right buttock, unstageable  Lung cancer metastatic to bone: Lung cancer metastatic to bone  Fall, initial encounter: Fall, initial encounter  Failure to thrive in adult: Failure to thrive in adult          SOCIAL HISTORY: unable to obtain secondary to patient's mental status    ACTIVE MEDICATION LIST:  MEDICATIONS  (STANDING):  sodium chloride 0.9%. 1000milliLiter(s) IV Continuous <Continuous>  aspirin enteric coated 81milliGRAM(s) Oral daily  sodium chloride 0.9%. 1000milliLiter(s) IV Continuous <Continuous>  sodium chloride 0.9%. 1000milliLiter(s) IV Continuous <Continuous>  dextrose 5%. 1000milliLiter(s) IV Continuous <Continuous>  dextrose 5%. 1000milliLiter(s) IV Continuous <Continuous>    MEDICATIONS  (PRN):  ondansetron Injectable 4milliGRAM(s) IV Push every 6 hours PRN Nausea and/or Vomiting  morphine  - Injectable 2milliGRAM(s) IV Push every 4 hours PRN shortness of breath      EXAM:  Vital Signs Last 24 Hrs  T(C): 36.7, Max: 37.8 (05-31 @ 20:26)  T(F): 98.1, Max: 100 (05-31 @ 20:26)  HR: 70 (66 - 106)  BP: 122/65 (102/66 - 122/65)  BP(mean): --  RR: 28 (19 - 34)  SpO2: 97% (96% - 100%)  GENERAL: No acute distress  NEURO: wakes up briefly to answer yes/no questions  LUNGS: decreased breath sounds bilaterally  CV: S1/S2  ABDOMEN: +BS, nontender  EXTREMITIES: LUE edematous    LABS/IMAGING: reviewed    PROBLEM LIST:  76yMale with HEALTH ISSUES - PROBLEM Dx:    Lung cancer metastatic to bone  Failure to thrive in adult  Functional quadriplegia:  Acute respiratory failure with hypoxia  Anemia, unspecified type  EMMETT (acute kidney injury)    RECS:  -Appreciate palliative care consult  -Continue PRN morphine, currently pt appears comfortable, if symptoms worsen may benefit from drip  -Hospice appropriate  -aspiration precaution    Thank you for this consultation, please feel free to call with any questions [phone]  Melissa Li MD
HPI:  This is a 76M with history of Stage 4 lung cancer with mets to brain, spine and bone s/p RT (recently hospitalized at [facility] from  - ) who is brought to the hospital by his daughter due to weakness and inability to care for self at home. As per the patient and his daughter, he had initially gone to [facility] [place] check up and lab work there showed gross abnormalities (both dont remember what the abnormalities were) which caused him to be hospitalized at [facility] where work up showed him to have stage 4 lung cancer with mets to brain, spine and bones. They said that he had RT during his hospitalization with last session being on  and was discharged home on . Daughter said that after the patient was discharged home, he was unable to care for himself properly, said that the patient is now completely dependant on others for his care when previously he was independent.         PERTINENT PMH REVIEWED:  [x ] YES [ ] NO           SOCIAL HISTORY:  Significant other/partner:  [ ] YES  [x ] NO            Children:  [x ] YES  [ ] NO                   Spiritism/Spirituality:  Subtance hx:  [ ] YES   [x ] NO           Tobacco hx:  [x ] YES  [ ] NO             Alcohol hx: [ ] YES  [x ] NO        Home Opiod hx:  [ ] YES  [x ] NO   Living Situation: [ x] Home  [ ] Long term care  [ ] Rehab    REFERRALS:   [x ] Chaplaincy  [x ] Hospice  [ ] Child Life  [ ] Social Work  [ ] Case management [ ] Holistic Therapy     FAMILY HISTORY:  Family history of breast cancer in mother (Mother): in Mother    [x ] Family history non contributory     BASELINE ADLs (prior to admission):  Independent [x ] moderately [ ] fully   Dependent   [ ] moderately [ ] fully    ADVANCE DIRECTIVES:  [ ] YES [x ] NO   DNR [ ] YES [x ] NO                      MOLST  [ ] YES [x ] NO    Living Will  [ ] YES [x ] NO    Health Care Proxy [x ] YES  [ ] NO      [ ] Surrogate  [x ] HCP  [ ] Guardian:      Mary Arreola  (daughter)                                          Phone#:  [phone]    Allergies    penicillin (Hives)    Intolerances        MEDICATIONS  (STANDING):  sodium chloride 0.9%. 1000milliLiter(s) IV Continuous <Continuous>  aspirin enteric coated 81milliGRAM(s) Oral daily  sodium chloride 0.9%. 1000milliLiter(s) IV Continuous <Continuous>  sodium chloride 0.9%. 1000milliLiter(s) IV Continuous <Continuous>  dextrose 5%. 1000milliLiter(s) IV Continuous <Continuous>    MEDICATIONS  (PRN):  ondansetron Injectable 4milliGRAM(s) IV Push every 6 hours PRN Nausea and/or Vomiting  morphine  - Injectable 2milliGRAM(s) IV Push every 4 hours PRN shortness of breath      PRESENT SYMPTOMS:  Source: [x ] Patient   [ ] Family   [x ] Team     Pain: [ ] YES [x ] NO  OLDCARTS:     Dyspnea: [x ] YES [ ] NO   Anxiety: [x ] YES [ ] NO  Fatigue: [x ] YES [ ] NO   Nausea: [ ] YES [x ] NO  Loss of appetite: [x ] YES [ ] NO   Constipation: [ ] YES [x ] NO     Other Symptoms:  [x ] All other review of systems negative   [ ] Unable to obtain due to poor mentation     Karnofsky Performance Score/Palliative Performance Status Version 2:  20       %  Protein Calorie Malutrition:  [ ] Mild   [ ] Moderate   [x ] Severe     Vital Signs Last 24 Hrs  T(C): 36.4, Max: 37.1 (05-30 @ 14:32)  T(F): 97.5, Max: 98.7 (05-30 @ 14:32)  HR: 69 (69 - 80)  BP: 100/69 (100/69 - 116/83)  BP(mean): --  RR: 20 (20 - 20)  SpO2: 98% (97% - 98%)    Physical Exam:    General: [ ] Alert,  A&O x 1    [x ] lethargic   [ ] Agitated   [ ] Cachexia   HEENT: [ ] Normal   [x ] Dry mouth   [ ] ET Tube    [ ] Trach   Lungs: [ ] Clear [x ] Rhonchi  [x ] Crackles [ ] Wheezing [ x] Tachypnea  [ ] Audible excessive secretions   Cardiovascular:  [ x] Regular rate and rhythm  [ ] Irregular [ ] Tachycardia   [ ] Bradycardia   Abdomen: [x ] Soft  [ ] Distended  [ ]  [ ] +BS  [x ] Non tender [ ] Tender  [ ]PEG   [ ] NGT   Last BM:     Genitourinary: [ ] Normal [x ] Incontinent   [ ] Oliguria/Anuria   [ ] [place]  Musculoskeletal:  [ ] Normal   [ x] Generalized weakness  [ ] Bedbound   Neurological: [x] No focal deficits  [ ] Cognitive impairment     Skin: [ x] Normal   [ ] Pressure ulcers     LABS:                        13.9   4.17  )-----------( 132      ( 30 May 2017 07:07 )             43.8     05-31    152<H>  |  116<H>  |  28<H>  ----------------------------<  136<H>  4.3   |  20<L>  |  0.69    Ca    8.0<L>      31 May 2017 05:42        Urinalysis Basic - ( 29 May 2017 21:15 )    Color: YELLOW / Appearance: CLEAR / S.026 / pH: 6.0  Gluc: NEGATIVE / Ketone: MODERATE  / Bili: SMALL / Urobili: >8 E.U.   Blood: MODERATE / Protein: 100 / Nitrite: NEGATIVE   Leuk Esterase: NEGATIVE / RBC: 10-25 / WBC 5-10   Sq Epi: OCC / Non Sq Epi: x / Bacteria: FEW      I&O's Summary      RADIOLOGY & ADDITIONAL STUDIES:    Reviewed

## 2017-06-01 NOTE — DISCHARGE NOTE ADULT - CARE PLAN
Principal Discharge DX:	Failure to thrive in adult  Goal:	Comfort care  Instructions for follow-up, activity and diet:	Patient and family wishes for Comfort Care Measures.  [facility].  Secondary Diagnosis:	Lung cancer metastatic to bone  Instructions for follow-up, activity and diet:	Continue pain medication as prescribed.

## 2017-06-01 NOTE — ADVANCED PRACTICE NURSE CONSULT - REASON FOR CONSULT
attempted to see patient for topical recommendations of stage 2 buttocks pressure injury. Patient was discharge to inpatient hospice.

## 2017-06-03 LAB — BACTERIA BLD CULT: SIGNIFICANT CHANGE UP

## 2018-05-11 NOTE — H&P ADULT - PROBLEM SELECTOR PLAN 1
throat, pain
- Patient with FTT 2/2 multifactorial causes - stage 4 lung cancer, prolonged recent hospitalization, anorexia  - Will obtain nutrition eval for nutrition optimization, will place on calorie count  - Regular diet for now, patient denies issues with eating currently
